# Patient Record
Sex: FEMALE | Race: BLACK OR AFRICAN AMERICAN | Employment: FULL TIME | URBAN - METROPOLITAN AREA
[De-identification: names, ages, dates, MRNs, and addresses within clinical notes are randomized per-mention and may not be internally consistent; named-entity substitution may affect disease eponyms.]

---

## 2021-01-08 ENCOUNTER — HOSPITAL ENCOUNTER (OUTPATIENT)
Dept: PREADMISSION TESTING | Age: 56
Discharge: HOME | End: 2021-01-08
Payer: COMMERCIAL

## 2021-01-08 PROCEDURE — 87635 SARS-COV-2 COVID-19 AMP PRB: CPT

## 2021-01-09 LAB — SARS-COV-2, COV2NT: NOT DETECTED

## 2021-01-09 RX ORDER — DIPHENHYDRAMINE HYDROCHLORIDE 50 MG/ML
50 INJECTION, SOLUTION INTRAMUSCULAR; INTRAVENOUS ONCE
Status: CANCELLED | OUTPATIENT
Start: 2021-01-09 | End: 2021-01-09

## 2021-01-09 RX ORDER — SODIUM CHLORIDE 0.9 % (FLUSH) 0.9 %
5-40 SYRINGE (ML) INJECTION AS NEEDED
Status: CANCELLED | OUTPATIENT
Start: 2021-01-09

## 2021-01-09 RX ORDER — ATROPINE SULFATE 0.1 MG/ML
0.5 INJECTION INTRAVENOUS
Status: CANCELLED | OUTPATIENT
Start: 2021-01-09 | End: 2021-01-10

## 2021-01-09 RX ORDER — EPINEPHRINE 0.1 MG/ML
1 INJECTION INTRACARDIAC; INTRAVENOUS
Status: CANCELLED | OUTPATIENT
Start: 2021-01-09 | End: 2021-01-10

## 2021-01-09 RX ORDER — DEXTROMETHORPHAN/PSEUDOEPHED 2.5-7.5/.8
1.2 DROPS ORAL
Status: CANCELLED | OUTPATIENT
Start: 2021-01-09

## 2021-01-09 RX ORDER — SODIUM CHLORIDE 0.9 % (FLUSH) 0.9 %
5-40 SYRINGE (ML) INJECTION EVERY 8 HOURS
Status: CANCELLED | OUTPATIENT
Start: 2021-01-09

## 2021-01-12 ENCOUNTER — HOSPITAL ENCOUNTER (OUTPATIENT)
Age: 56
Setting detail: OUTPATIENT SURGERY
Discharge: HOME | End: 2021-01-12
Attending: INTERNAL MEDICINE | Admitting: INTERNAL MEDICINE
Payer: COMMERCIAL

## 2021-01-12 VITALS
DIASTOLIC BLOOD PRESSURE: 71 MMHG | BODY MASS INDEX: 34.85 KG/M2 | HEART RATE: 70 BPM | RESPIRATION RATE: 18 BRPM | OXYGEN SATURATION: 100 % | SYSTOLIC BLOOD PRESSURE: 120 MMHG | WEIGHT: 209.2 LBS | TEMPERATURE: 96.9 F | HEIGHT: 65 IN

## 2021-01-12 PROCEDURE — 77030040361 HC SLV COMPR DVT MDII -B: Performed by: INTERNAL MEDICINE

## 2021-01-12 PROCEDURE — 76040000007: Performed by: INTERNAL MEDICINE

## 2021-01-12 PROCEDURE — G0500 MOD SEDAT ENDO SERVICE >5YRS: HCPCS | Performed by: INTERNAL MEDICINE

## 2021-01-12 PROCEDURE — 74011250636 HC RX REV CODE- 250/636: Performed by: INTERNAL MEDICINE

## 2021-01-12 PROCEDURE — 77030039961 HC KT CUST COLON BSC -D: Performed by: INTERNAL MEDICINE

## 2021-01-12 PROCEDURE — 2709999900 HC NON-CHARGEABLE SUPPLY: Performed by: INTERNAL MEDICINE

## 2021-01-12 RX ORDER — SODIUM CHLORIDE 9 MG/ML
1000 INJECTION, SOLUTION INTRAVENOUS CONTINUOUS
Status: DISCONTINUED | OUTPATIENT
Start: 2021-01-12 | End: 2021-01-12 | Stop reason: HOSPADM

## 2021-01-12 RX ORDER — NALOXONE HYDROCHLORIDE 0.4 MG/ML
0.4 INJECTION, SOLUTION INTRAMUSCULAR; INTRAVENOUS; SUBCUTANEOUS
Status: DISCONTINUED | OUTPATIENT
Start: 2021-01-12 | End: 2021-01-12 | Stop reason: HOSPADM

## 2021-01-12 RX ORDER — MIDAZOLAM HYDROCHLORIDE 1 MG/ML
.25-5 INJECTION, SOLUTION INTRAMUSCULAR; INTRAVENOUS
Status: DISCONTINUED | OUTPATIENT
Start: 2021-01-12 | End: 2021-01-12 | Stop reason: HOSPADM

## 2021-01-12 RX ORDER — FENTANYL CITRATE 50 UG/ML
100 INJECTION, SOLUTION INTRAMUSCULAR; INTRAVENOUS
Status: DISCONTINUED | OUTPATIENT
Start: 2021-01-12 | End: 2021-01-12 | Stop reason: HOSPADM

## 2021-01-12 RX ORDER — FLUMAZENIL 0.1 MG/ML
0.2 INJECTION INTRAVENOUS
Status: DISCONTINUED | OUTPATIENT
Start: 2021-01-12 | End: 2021-01-12 | Stop reason: HOSPADM

## 2021-01-12 RX ADMIN — SODIUM CHLORIDE 1000 ML: 900 INJECTION, SOLUTION INTRAVENOUS at 08:07

## 2021-01-12 NOTE — DISCHARGE INSTRUCTIONS
Scotty Peres  [id number]  1965    COLON DISCHARGE INSTRUCTIONS    Discomfort:  Redness at IV site- apply warm compress to area; if redness or soreness persist- contact your physician  There may be a slight amount of blood passed from the rectum  Gaseous discomfort- walking, belching will help relieve any discomfort  You may not operate a vehicle til the next day. You may not engage in an occupation involving machinery or appliances til the next day. You may not drink alcoholic beverages til the next day. DIET:   High fiber diet. ACTIVITY:  You may not  resume your normal daily activities til the next day. it is recommended that you spend the remainder of the day resting -  avoid any strenuous activity. CALL M.D.  IF ANY SIGN OF:   Increasing pain, nausea, vomiting  Abdominal distension (swelling)  New increased bleeding (oral or rectal)  Fever (chills)  Pain in chest area  Bloody discharge from nose or mouth  Shortness of breath    You may  take any Advil, Aspirin, Ibuprofen, Motrin, Aleve, or Goodys but preferably Tylenol as needed for pain. Post procedure diagnosis:  NORMAL EXAM    Follow-up Instructions: Your follow up colonoscopy will be in 10 years. Rodriguez Hope MD  January 12, 2021       DISCHARGE SUMMARY from Nurse    PATIENT INSTRUCTIONS:    After general anesthesia or intravenous sedation, for 24 hours or while taking prescription Narcotics:  · Limit your activities  · Do not drive and operate hazardous machinery  · Do not make important personal or business decisions  · Do  not drink alcoholic beverages  · If you have not urinated within 8 hours after discharge, please contact your surgeon on call.     Report the following to your surgeon:  · Excessive pain, swelling, redness or odor of or around the surgical area  · Temperature over 100.5  · Nausea and vomiting lasting longer than 4 hours or if unable to take medications  · Any signs of decreased circulation or nerve impairment to extremity: change in color, persistent  numbness, tingling, coldness or increase pain  · Any questions    What to do at Home:  Recommended activity: Activity as tolerated and no driving for today. *  Please give a list of your current medications to your Primary Care Provider. *  Please update this list whenever your medications are discontinued, doses are      changed, or new medications (including over-the-counter products) are added. *  Please carry medication information at all times in case of emergency situations. These are general instructions for a healthy lifestyle:    No smoking/ No tobacco products/ Avoid exposure to second hand smoke  Surgeon General's Warning:  Quitting smoking now greatly reduces serious risk to your health. Obesity, smoking, and sedentary lifestyle greatly increases your risk for illness    A healthy diet, regular physical exercise & weight monitoring are important for maintaining a healthy lifestyle    You may be retaining fluid if you have a history of heart failure or if you experience any of the following symptoms:  Weight gain of 3 pounds or more overnight or 5 pounds in a week, increased swelling in our hands or feet or shortness of breath while lying flat in bed. Please call your doctor as soon as you notice any of these symptoms; do not wait until your next office visit. The discharge information has been reviewed with the patient and parent. The patient and parent verbalized understanding. Discharge medications reviewed with the patient and mother and appropriate educational materials and side effects teaching were provided.   _____________________________________________  Patient armband removed and shredded  ______________________________________________________________________________________

## 2021-01-12 NOTE — H&P
Assessment/Plan  # Detail Type Description    1. Assessment Family history of malignant neoplasm of digestive organ (Z80.0). Patient Plan Maternal and Paternal Grandmothers both had colon cancer diagnosed in their 63's. No personal history of colonic polyps, 5 year Recall due to family history of colon cancer. Colonoscopy ordered, with Suprep bowel prep. Stressed importance of following all bowel preparation instructions. Explained the procedure to the patient including all risks and benefits. These risks consist of missed lesions on exam, bleeding, and bowel perforation with possible need for admission to the hospital, and in the most extensive of  circumstances, the patient may require surgery. Plan Orders Further diagnostic evaluations ordered today include(s) DIAGNOSTIC COLONOSCOPY to be performed. She will be scheduled for GASTROENTEROLOGY PROCEDURE, Next Lab Date is within 1 Month on 01/12/2021. Clinical information/comments: at location [facility]. The surgeon scheduled is Tia Sheth MD. An assistant has not been requested. 2. Assessment Body mass index (BMI) 34.0-34.9, adult (Z68.34). Patient Plan BMI: 34.64, pt is obese. Encouraged pt to lose significant amount of weight. Diagnostic History Entered Today  Performed Study Interpretation Result   02/03/2016 DIAGNOSTIC COLONOSCOPY  2/3/2016 - Colonoscopy  Dr. Quinones  Normal colonoscopy, No polyps  Rectum - Small internal hemorrhoids  High fiber diet  5 year Recall due to Family history       This 54year old female presents for Family History of Colon Cancer. History of Present Illness:  1. Family History of Colon Cancer   No prior screening. Risk Factors: Maternal Grandmother. Pertinent negatives include abdominal pain, change in bowel habits, change in stool caliber, constipation, decreased appetite, diarrhea, melena, nausea, rectal bleeding, vomiting, weight gain and weight loss.   Additional information: Patient has family history of colon cancer,  Last colonoscopy was on 2/3/16  No polyps retrieved and  BM 2x daily. 2/3/2016 - Colonoscopy  Dr. Cheri Nelson  Normal colonoscopy, No polyps  Rectum - Small internal hemorrhoids  High fiber diet  5 year Recall due to Family history      PROBLEM LIST:     Problem Description Onset Date Chronic Clinical Status Notes   Gastric reflux 2015 N     Family history of hereditary nonpolyposis colon cancer 2020 N               PAST MEDICAL/SURGICAL HISTORY   (Detailed)    Disease/disorder Onset Date Management Date Comments   fibroid removal    SJB 2015 -   GERD       HLD - Hyperlipidemia       Vitamin deficiency       Morbid obesity             Family History  (Detailed)  Relationship Family Member Name  Age at Death Condition Onset Age Cause of Death   Father    Hypertension  N   Maternal aunt    Cancer, breast  N   Maternal grandmother    Colon cancer 61 N   Paternal grandmother    Colon cancer 61 N         Social History:  (Detailed)  Tobacco use reviewed. Preferred language is [place]. MARITAL STATUS/FAMILY/SOCIAL SUPPORT  Marital status:    CHILDREN  Does not have children. Smoking status: Never smoker. TOBACCO SCREENING:  Patient has never used tobacco. Patient has not used tobacco in the last 30 days. Patient has not used smokeless tobacco in the last 30 days. SMOKING STATUS  Type Smoking Status Usage Per Day Years Used Pack Years Total Pack Years    Never smoker         TOBACCO/VAPING EXPOSURE  No passive smoke exposure. ALCOHOL  There is a history of alcohol use. Type: Wine. 2 drinks consumed weekly. Last alcoholic drink was last week. CAFFEINE  The patient uses caffeine: coffee - 2 cups a day. LIFESTYLE  Moderate activity level. Health club member. Exercise includes weights. Exercises 3-4 times a week. SLEEP PATTERNS  Patient has no changes to sleep patterns.               Medications (active prior to today)  Medication Name Sig Description Start Date Stop Date Refilled Rx Elsewhere   calcium-magnesium 300 mg-300 mg tablet  10/30/2017   N   Vitamin D2 50,000 unit capsule take 1 capsule by oral route  every week 10/30/2017   N   vitamin J26 479 mcg-folic acid 258 mcg tablet  10/30/2017   N   Daily Multivitamin 200 mcg-100 mcg-500 mcg capsule  10/30/2017   N   Vitamin C Energy Booster 1,000 mg oral effervescent powder packet  10/30/2017   N     Patient Status   Completed with information received for patient in a summary of care record. Medication Reconciliation  Medications reconciled today.   Medication Reviewed  Adherence Medication Name Sig Desc Elsewhere Status   taking as directed calcium-magnesium 300 mg-300 mg tablet  N Verified   taking as directed Vitamin D2 50,000 unit capsule take 1 capsule by oral route  every week N Verified   taking as directed vitamin C19 522 mcg-folic acid 115 mcg tablet  N Verified   taking as directed Daily Multivitamin 200 mcg-100 mcg-500 mcg capsule  N Verified   taking as directed Vitamin C Energy Booster 1,000 mg oral effervescent powder packet  N Verified   taking as directed Suprep Bowel Prep Kit 17.5 gram-3.13 gram-1.6 gram oral solution Take as directed N Verified     Medications (Added, Continued or Stopped today)  Start Date Medication Directions PRN Status PRN Reason Instruction Stop Date   10/30/2017 calcium-magnesium 300 mg-300 mg tablet  N      10/30/2017 Daily Multivitamin 200 mcg-100 mcg-500 mcg capsule  N      12/01/2020 Suprep Bowel Prep Kit 17.5 gram-3.13 gram-1.6 gram oral solution Take as directed N      10/30/2017 vitamin C89 632 mcg-folic acid 890 mcg tablet  N      10/30/2017 Vitamin C Energy Booster 1,000 mg oral effervescent powder packet  N      10/30/2017 Vitamin D2 50,000 unit capsule take 1 capsule by oral route  every week N        Allergies:  Ingredient Reaction (Severity) Medication Name Comment   NO KNOWN ALLERGIES      Reviewed, no changes. ORDERS:  Status Lab Order Time Frame Comments   ordered DIAGNOSTIC COLONOSCOPY     ordered GASTROENTEROLOGY PROCEDURE within 1 Month at location [address] surgeon scheduled is Zhang Yang MD. An assistant has not been requested. Review of System  System Neg/Pos Details   Constitutional Negative Fever, Weight gain and Weight loss. ENMT Negative Sinus Infection. Eyes Negative Double vision. Respiratory Negative Asthma, Chronic cough and Dyspnea. Cardio Negative Chest pain, Edema and Irregular heartbeat/palpitations. GI Negative Abdominal pain, Change in bowel habits, Change in stool caliber, Constipation, Decreased appetite, Diarrhea, Dysphagia, Heartburn, Hematemesis, Hematochezia, Melena, Nausea, Rectal bleeding, Reflux and Vomiting.  Negative Dysuria and Hematuria. Endocrine Negative Cold intolerance and Heat intolerance. Neuro Negative Dizziness, Headache, Numbness and Tremors. Psych Negative Anxiety, Depression and Increased stress. Integumentary Negative Hives, Pruritus and Rash. MS Negative Back pain, Joint pain and Myalgia. Hema/Lymph Negative Easy bleeding, Easy bruising and Lymphadenopathy. Allergic/Immuno Negative Food allergies and Immunosuppression. Vital Signs   Height  Time ft in cm Last Measured Height Position   12:05 PM 5.0 4.50 163.83 12/01/2020 Standing     MAP (Calculated) Arterial Line 1 BP (mmHg) BP Patient Position Resp SpO2 O2 Device O2 Flow Rate (L/min) Pre/Post Ductal Weight       01/12/21 0759 97.5 °F (36.4 °C) 88 131/81 98   16 98 % Room air   94.9 kg (209 lb 3.2 oz)       PHYSICAL EXAM:  Exam Findings Details   Constitutional Normal Well developed.    Eyes Normal Conjunctiva - Right: Normal, Left: Normal. Sclera - Right: Normal, Left: Normal.   Nasopharynx Normal Lips/teeth/gums - Normal.   Neck Exam Normal Inspection - Normal.   Respiratory Normal Inspection - Normal.   Cardiovascular Normal Regular rate and rhythm. No murmurs, gallops, or rubs. Vascular Normal Pulses - Brachial: Normal.   Abdomen Normal Inspection - Normal. Appliance(s) - Normal. Auscultation - Normal. Percussion - Normal. Anterior palpation - No guarding. No abdominal tenderness. No hepatic enlargement. No hernia. No Ascites. Skin Normal Inspection - Normal.   Musculoskeletal Normal Hands/Wrist - Right: Normal, Left: Normal.   Extremity Normal No edema. Neurological Normal Fine motor skills - Normal.   Psychiatric Normal Orientation - Oriented to time, place, person & situation. Appropriate mood and affect.          No change in H&P

## 2021-01-12 NOTE — PROCEDURES
[facility]  Colonoscopy Procedure Report  _______________________________________________________  Patient: Manasa Calvo                                        Attending Physician: Ronnie Crump MD    Patient ID: [id number]                                    Referring Physician: Stephanie Mcwilliams MD    Exam Date: 1/12/2021      Introduction: A  64 y.o. female patient, presents for inpatient Colonoscopy    Indications: Screening for colon cancer, Maternal and Paternal Grandmothers both had colon cancer diagnosed in their 63's. No personal history of colonic polyps, Last colonoscopy by my self on 2/3/2016: Small internal hemorrhoids no polyps. Sp tubal ligation. GERD    Consent: The benefits, risks, and alternatives to the procedure were discussed and informed consent was obtained from the patient. Preparation: EKG, pulse, pulse oximetry and blood pressure were monitored throughout the procedure. ASA Classification: Class I- . The heart is an S1-S2 and regular heart rate and rhythm. Lungs are clear to auscultation and percussion. Abdomen is soft, nondistended, and nontender. Mental Status: awake, alert, and oriented to person, place, and time    Medications:  · Fentanyl 100 mcg IV before procedure. · Versed 3 mg IV throughout the procedure. Rectal Exam: Normal Rectal Exam. No Blood. Pathology Specimens:  0    Procedure: The colonoscope was passed with ease through the anus under direct visualization and advanced to the cecum and 5 cm inside the terminal ileum. The scope was withdrawn and the mucosa was carefully examined. The quality of the preparation was excellent. The views were excellent. The patient's toleration of the procedure was excellent. The exam was done twice to the cecum. Retroflexion is made in the ascending colon and the rectum Total time is 25 minutes and withdrawal time is 23 minutes. Findings:    Rectum:   Small internal hemorrhoids.   Sigmoid:   normal   Descending Colon:   Normal   Transverse Colon:   Normal   Ascending Colon:   Normal   Cecum:   Normal   Terminal Ileum:   Normal      Unplanned Events: There were no unplanned events. Estimated Blood Loss: None  Impressions: Small internal hemorrhoids. Normal Mucosa. No blood, polyps or AVM found. Complications: None; patient tolerated the procedure well. Recommendations:  · Discharge home when standard parameters are met. · Resume a high fiber diet. · Resume own medications. Avoid all NSAID's for  · Colonoscopy recommendation in 10 years because 2 previous colonoscopies were negative but in doubt or symptoms it could be done earlier than 10 years.       Procedure Codes:    · COLONOSCOPY [[id number] (Type: Custom)]    Endoscope Information:  Model Number(s)    [id number]   Assistant: None  Signed By: Narayan Pitts MD Date: 1/12/2021

## (undated) DEVICE — KENDALL RADIOLUCENT FOAM MONITORING ELECTRODE RECTANGULAR SHAPE: Brand: KENDALL

## (undated) DEVICE — TRAP SPEC COLL POLYP POLYSTYR --

## (undated) DEVICE — CATH IV SAFE STR 22GX1IN BLU -- PROTECTIV PLUS

## (undated) DEVICE — SPONGE GZ W4XL4IN COT 12 PLY TYP VII WVN C FLD DSGN

## (undated) DEVICE — SINGLE PORT MANIFOLD: Brand: NEPTUNE 2

## (undated) DEVICE — TUBING, SUCTION, 1/4" X 12', STRAIGHT: Brand: MEDLINE

## (undated) DEVICE — CANNULA CUSH AD W/ 14FT TBG

## (undated) DEVICE — SPONGE GZ W4XL4IN RAYON POLY 4 PLY NONWOVEN FASTER WICKING

## (undated) DEVICE — SYR 5ML 1/5 GRAD LL NSAF LF --

## (undated) DEVICE — SOLUTION IV 500ML 0.9% SOD CHL FLX CONT

## (undated) DEVICE — NDL PRT INJ NSAF BLNT 18GX1.5 --

## (undated) DEVICE — NDL FLTR TIP 5 MIC 18GX1.5IN --

## (undated) DEVICE — SYRINGE 50ML E/T

## (undated) DEVICE — TRNQT TEXT 1X18IN BLU LF DISP -- CONVERT TO ITEM 362165

## (undated) DEVICE — Device

## (undated) DEVICE — GARMENT,MEDLINE,DVT,INT,CALF,MED, GEN2: Brand: MEDLINE

## (undated) DEVICE — CATH SUC CTRL PRT TRIFLO 14FR --

## (undated) DEVICE — SYR 3ML LL TIP 1/10ML GRAD --

## (undated) DEVICE — WRISTBAND ID AD W2.5XL9.5CM RED VYN ADH CLSR UNI-PRINT

## (undated) DEVICE — SET ADMIN 16ML TBNG L100IN 2 Y INJ SITE IV PIGGY BK DISP

## (undated) DEVICE — MAJ-1414 SINGLE USE ADPATER BIOPSY VALV: Brand: SINGLE USE ADAPTOR BIOPSY VALVE